# Patient Record
Sex: FEMALE | Race: WHITE | Employment: FULL TIME | URBAN - METROPOLITAN AREA
[De-identification: names, ages, dates, MRNs, and addresses within clinical notes are randomized per-mention and may not be internally consistent; named-entity substitution may affect disease eponyms.]

---

## 2017-05-25 PROBLEM — Z86.010 PERSONAL HISTORY OF COLONIC POLYPS: Status: ACTIVE | Noted: 2017-05-25

## 2017-05-25 PROBLEM — Z86.0100 PERSONAL HISTORY OF COLONIC POLYPS: Status: ACTIVE | Noted: 2017-05-25

## 2018-12-10 PROBLEM — Z86.0100 HX OF COLONIC POLYPS: Status: ACTIVE | Noted: 2018-10-09

## 2018-12-10 PROBLEM — Z86.010 HX OF COLONIC POLYPS: Status: ACTIVE | Noted: 2018-10-09

## 2023-11-27 ENCOUNTER — TELEPHONE (OUTPATIENT)
Dept: FAMILY MEDICINE CLINIC | Facility: CLINIC | Age: 68
End: 2023-11-27

## 2023-11-27 ENCOUNTER — MED REC SCAN ONLY (OUTPATIENT)
Dept: FAMILY MEDICINE CLINIC | Facility: CLINIC | Age: 68
End: 2023-11-27

## 2023-11-27 NOTE — TELEPHONE ENCOUNTER
Received Medical Records from [address] [place] [address]/[facility] 11/27/23. Sent to scanning.

## 2023-12-13 RX ORDER — SEMAGLUTIDE 0.68 MG/ML
INJECTION, SOLUTION SUBCUTANEOUS
Qty: 1 EACH | Refills: 0 | OUTPATIENT
Start: 2023-12-13

## 2023-12-13 RX ORDER — SEMAGLUTIDE 0.68 MG/ML
INJECTION, SOLUTION SUBCUTANEOUS
COMMUNITY
Start: 2023-10-23

## 2023-12-13 NOTE — TELEPHONE ENCOUNTER
Pt requesting a refill on her Ozempic 0.5      [facility], [address] [place] [address]  [phone]

## 2023-12-13 NOTE — TELEPHONE ENCOUNTER
LOV 05/26/23 w/ Dr. Karl Cuello   (was seen for routine pap, screenings, etc -but not listed as well adult)    Last labs 07/30/21  Last refill on 10/23/23, for #3ml, with ? refills  OZEMPIC 0.25-0.5 MG/DOSE PEN     No future appointments. Order(s) pending, please review. Thank you.   [facility] [place]

## 2023-12-14 RX ORDER — SEMAGLUTIDE 0.68 MG/ML
INJECTION, SOLUTION SUBCUTANEOUS
Qty: 1 EACH | Refills: 0 | Status: CANCELLED | OUTPATIENT
Start: 2023-12-14

## 2023-12-14 NOTE — TELEPHONE ENCOUNTER
Left detailed message to voicemail (per verbal release form consent with confirmed identifying message) of Dr. Ivan Morataya note below.  Patient was advised to call office back - needs to schedule appt w/ Dr. Gemma Patel

## 2023-12-14 NOTE — TELEPHONE ENCOUNTER
I have not prescribed Ozempic for this patient before and if she wishes to have that she is advised that or even a virtual visit to establish care here.  Thank jigna mackey

## 2023-12-19 NOTE — TELEPHONE ENCOUNTER
Left detailed message to voicemail (per verbal release form consent with confirmed identifying message) of Dr. Elizabeth Velasquez note below. Patient was advised to call office back - needs to schedule appt w/ Dr. Graham Cuevas       No future appointments.

## 2024-08-07 ENCOUNTER — TELEPHONE (OUTPATIENT)
Dept: FAMILY MEDICINE CLINIC | Facility: CLINIC | Age: 69
End: 2024-08-07

## 2024-08-07 DIAGNOSIS — Z00.00 MEDICARE ANNUAL WELLNESS VISIT, SUBSEQUENT: Primary | ICD-10-CM

## 2024-08-07 DIAGNOSIS — R63.5 UNEXPLAINED WEIGHT GAIN: ICD-10-CM

## 2024-08-07 NOTE — TELEPHONE ENCOUNTER
Left message to voicemail.  Advised patient to call office back [phone] - need to discuss note below.

## 2024-08-07 NOTE — TELEPHONE ENCOUNTER
PT CALLED AND ADV WAS FORMER PT    PT WOULD LIKE TO KNOW IF DR CAN PLACE ORDERS BEFORE VISIT    PT HAS BEEN DIETING SINCE FEBRUARY AND HAS HAD NO MOVEMENT.    WONDERING IF THERE IS A HORMONAL TEST?    PLEASE ADV    Future Appointments   Date Time Provider Department Center   9/16/2024  8:20 AM Ayana Curiel MD [facility] [place]       THANK YOU

## 2024-10-23 ENCOUNTER — TELEPHONE (OUTPATIENT)
Dept: FAMILY MEDICINE CLINIC | Facility: CLINIC | Age: 69
End: 2024-10-23

## 2024-10-23 NOTE — TELEPHONE ENCOUNTER
Received fax and mail from [facility] regarding pt's labs completed 10/17/24  Original copy sent to scanning    Dr. Murrieta reviewed: Follow-up?    Future Appointments   Date Time Provider Department Center   11/4/2024 10:00 AM Ayana Curiel MD [facility] [place]     Copy placed in \"hold for future appts\" folder

## 2024-10-24 ENCOUNTER — MED REC SCAN ONLY (OUTPATIENT)
Dept: FAMILY MEDICINE CLINIC | Facility: CLINIC | Age: 69
End: 2024-10-24

## 2024-11-04 ENCOUNTER — OFFICE VISIT (OUTPATIENT)
Dept: FAMILY MEDICINE CLINIC | Facility: CLINIC | Age: 69
End: 2024-11-04
Payer: MEDICARE

## 2024-11-04 VITALS
WEIGHT: 183.38 LBS | OXYGEN SATURATION: 96 % | BODY MASS INDEX: 35.53 KG/M2 | DIASTOLIC BLOOD PRESSURE: 80 MMHG | HEIGHT: 60.24 IN | SYSTOLIC BLOOD PRESSURE: 128 MMHG | HEART RATE: 80 BPM | RESPIRATION RATE: 18 BRPM | TEMPERATURE: 97 F

## 2024-11-04 DIAGNOSIS — Z13.6 SCREENING, ISCHEMIC HEART DISEASE: ICD-10-CM

## 2024-11-04 DIAGNOSIS — E78.2 MIXED HYPERLIPIDEMIA: ICD-10-CM

## 2024-11-04 DIAGNOSIS — E66.01 MORBID (SEVERE) OBESITY DUE TO EXCESS CALORIES (HCC): ICD-10-CM

## 2024-11-04 DIAGNOSIS — Z12.31 ENCOUNTER FOR SCREENING MAMMOGRAM FOR HIGH-RISK PATIENT: ICD-10-CM

## 2024-11-04 DIAGNOSIS — Z78.0 POST-MENOPAUSAL: ICD-10-CM

## 2024-11-04 DIAGNOSIS — Z00.00 MEDICARE ANNUAL WELLNESS VISIT, SUBSEQUENT: Primary | ICD-10-CM

## 2024-11-04 DIAGNOSIS — R79.89 ABNORMAL CBC: ICD-10-CM

## 2024-11-04 DIAGNOSIS — R73.9 ELEVATED BLOOD SUGAR: ICD-10-CM

## 2024-11-04 DIAGNOSIS — Z12.11 SCREEN FOR COLON CANCER: ICD-10-CM

## 2024-11-04 LAB
BASOPHILS # BLD AUTO: 0.03 X10(3) UL (ref 0–0.2)
BASOPHILS NFR BLD AUTO: 0.4 %
DEPRECATED HBV CORE AB SER IA-ACNC: 29 NG/ML
EOSINOPHIL # BLD AUTO: 0.35 X10(3) UL (ref 0–0.7)
EOSINOPHIL NFR BLD AUTO: 4.3 %
ERYTHROCYTE [DISTWIDTH] IN BLOOD BY AUTOMATED COUNT: 13.3 %
EST. AVERAGE GLUCOSE BLD GHB EST-MCNC: 120 MG/DL (ref 68–126)
HBA1C MFR BLD: 5.8 % (ref ?–5.7)
HCT VFR BLD AUTO: 40.8 %
HGB BLD-MCNC: 12.7 G/DL
IMM GRANULOCYTES # BLD AUTO: 0.01 X10(3) UL (ref 0–1)
IMM GRANULOCYTES NFR BLD: 0.1 %
IRON SATN MFR SERPL: 15 %
IRON SERPL-MCNC: 55 UG/DL
LYMPHOCYTES # BLD AUTO: 2.32 X10(3) UL (ref 1–4)
LYMPHOCYTES NFR BLD AUTO: 28.5 %
MCH RBC QN AUTO: 28.5 PG (ref 26–34)
MCHC RBC AUTO-ENTMCNC: 31.1 G/DL (ref 31–37)
MCV RBC AUTO: 91.5 FL
MONOCYTES # BLD AUTO: 0.6 X10(3) UL (ref 0.1–1)
MONOCYTES NFR BLD AUTO: 7.4 %
NEUTROPHILS # BLD AUTO: 4.84 X10 (3) UL (ref 1.5–7.7)
NEUTROPHILS # BLD AUTO: 4.84 X10(3) UL (ref 1.5–7.7)
NEUTROPHILS NFR BLD AUTO: 59.3 %
PLATELET # BLD AUTO: 234 10(3)UL (ref 150–450)
RBC # BLD AUTO: 4.46 X10(6)UL
TOTAL IRON BINDING CAPACITY: 358 UG/DL (ref 250–425)
TRANSFERRIN SERPL-MCNC: 272 MG/DL (ref 250–380)
WBC # BLD AUTO: 8.2 X10(3) UL (ref 4–11)

## 2024-11-04 PROCEDURE — 83036 HEMOGLOBIN GLYCOSYLATED A1C: CPT | Performed by: FAMILY MEDICINE

## 2024-11-04 PROCEDURE — 82728 ASSAY OF FERRITIN: CPT | Performed by: FAMILY MEDICINE

## 2024-11-04 PROCEDURE — 83540 ASSAY OF IRON: CPT | Performed by: FAMILY MEDICINE

## 2024-11-04 PROCEDURE — 85025 COMPLETE CBC W/AUTO DIFF WBC: CPT | Performed by: FAMILY MEDICINE

## 2024-11-04 PROCEDURE — 83550 IRON BINDING TEST: CPT | Performed by: FAMILY MEDICINE

## 2024-11-04 RX ORDER — PRAVASTATIN SODIUM 10 MG
10 TABLET ORAL NIGHTLY
Qty: 30 TABLET | Refills: 2 | Status: SHIPPED | OUTPATIENT
Start: 2024-11-04

## 2024-11-04 RX ORDER — CALCIUM CARBONATE 500 MG/1
1 TABLET, CHEWABLE ORAL DAILY
COMMUNITY

## 2024-11-04 RX ORDER — MULTIVITAMIN WITH IRON
50 TABLET ORAL DAILY
COMMUNITY

## 2024-11-04 RX ORDER — SEMAGLUTIDE 0.68 MG/ML
0.25 INJECTION, SOLUTION SUBCUTANEOUS WEEKLY
Qty: 3 ML | Refills: 0 | Status: SHIPPED | OUTPATIENT
Start: 2024-11-04

## 2024-11-04 NOTE — PROGRESS NOTES
Subjective:   Tammy Gonzalez is a 69 year old female who presents for a Medicare Subsequent Annual Wellness visit (Pt already had Initial Annual Wellness) and scheduled follow up of multiple significant but stable problems.   Obesity and mixed hyperlipidemia. Pt agrees to heart screen mammogram and cone scan. She is snot sexually active and has not had abnormal PAP in prior 10 years. She work sin [facility] [employer] and wishes to complete orders there. She has low mchc and is due for colonospcy . She is agreeable to iron studies and want to see Dr Rodriguez who did her prior colon screen    Pt was on ozempic and had to stop but wishes to try it again. She has a history of elevated blood sugar and agrees to have A1c checked    History/Other:   Fall Risk Assessment:   She has been screened for Falls and is low risk.      Cognitive Assessment:   She had a completely normal cognitive assessment - see flowsheet entries     Functional Ability/Status:   Tammy Gonzalez has some abnormal functions as listed below:  She has Vision problems based on screening of functional status.       Depression Screening (PHQ):  PHQ-2 SCORE: 0  , done 11/4/2024             Advanced Directives:   She does NOT have a Living Will. [Do you have a living will?: No]  She does NOT have a Power of  for Health Care. [Do you have a healthcare power of ?: No]  Discussed Advance Care Planning with patient (and family/surrogate if present). Standard forms made available to patient in After Visit Summary.      Patient Active Problem List   Diagnosis    Diverticulosis of colon    Hx of colonic polyps    Morbid (severe) obesity due to excess calories (HCC)     Allergies:  She has No Known Allergies.    Current Medications:  Outpatient Medications Marked as Taking for the 11/4/24 encounter (Office Visit) with Ayana Curiel MD   Medication Sig    vitamin B-12 50 MCG Oral Tab Take 1 tablet (50 mcg total) by mouth daily.     calcium carbonate 500 MG Oral Chew Tab Chew 1 tablet (500 mg total) by mouth daily.    semaglutide (OZEMPIC, 0.25 OR 0.5 MG/DOSE,) 2 MG/3ML Subcutaneous Solution Pen-injector Inject 0.25 mg into the skin once a week.    pravastatin 10 MG Oral Tab Take 1 tablet (10 mg total) by mouth nightly.    Cholecalciferol (VITAMIN D) 1000 units Oral Tab Take 1,000 Units by mouth.       Medical History:  She  has a past medical history of Diverticulosis of colon and colonic polyps (10/09/2018).  Surgical History:  She  has a past surgical history that includes  ();  (); colonoscopy (2011); colonoscopy (04/15/2015); and colonoscopy (10/09/2018).   Family History:  Her family history includes Cancer in her father and sister.  Social History:  She  reports that she quit smoking about 24 years ago. Her smoking use included cigarettes. She has been exposed to tobacco smoke. She has never used smokeless tobacco. She reports that she does not currently use alcohol. She reports that she does not use drugs.    Tobacco:  She smoked tobacco in the past but quit greater than 12 months ago.  Social History     Tobacco Use   Smoking Status Former    Current packs/day: 0.00    Types: Cigarettes    Quit date: 2000    Years since quittin.8    Passive exposure: Past   Smokeless Tobacco Never        CAGE Alcohol Screen:   CAGE screening score of 0 on 2024, showing low risk of alcohol abuse.      Patient Care Team:  Ayana Curiel MD as PCP - General (Family Medicine)    Review of Systems   As per HPI and all other systems reviewed and are negative      Objective:   Physical Exam    /80   Pulse 80   Temp 97.2 °F (36.2 °C) (Temporal)   Resp 18   Ht 5' 0.24\" (1.53 m)   Wt 183 lb 6.4 oz (83.2 kg)   SpO2 96%   BMI 35.54 kg/m²  Estimated body mass index is 35.54 kg/m² as calculated from the following:    Height as of this encounter: 5' 0.24\" (1.53 m).    Weight as of this encounter:  183 lb 6.4 oz (83.2 kg).  Physical Exam  Constitutional:       Appearance: Normal appearance.   HEENT:      Head: Normocephalic and atraumatic.      Eyes: PERRLA no notable nystagmus     Ears: normal on observation     Nose: Nose normal.      Mouth: Mucous membranes are moist.      Neck: no masses no bruit  Cardiovascular:      Rate and Rhythm: Normal rate and regular rhythm.   Pulmonary:      Effort: Pulmonary effort is normal.      Breath sounds: Normal breath sounds.   Abdominal:      General: Bowel sounds are normal.      Palpations: Abdomen is soft. There is no mass.   Musculoskeletal:         General: Normal range of motion.      Cervical back: Normal range of motion.   Skin:     General: Skin is warm and dry.   Neurological:      General: No focal deficit present.      Mental Status: She is alert and oriented to person, place, and time.   Psychiatric:         Mood and Affect: Mood normal.         Thought Content: Thought content normal.     Medicare Hearing Assessment:   Hearing Screening    Time taken: 11/4/2024 10:03 AM  Screening Method: Finger Rub  Finger Rub Result: Pass         Visual Acuity:   Right Eye Visual Acuity: Uncorrected Right Eye Chart Acuity: 20/70   Left Eye Visual Acuity: Corrected Left Eye Chart Acuity: 20/40   Both Eyes Visual Acuity: Corrected Both Eyes Chart Acuity: 20/40            Assessment & Plan:   Tammy Gonzalez is a 69 year old female who presents for a Medicare Assessment.     1. Medicare annual wellness visit, subsequent (Primary)  2. Encounter for screening mammogram for high-risk patient  -     [facility] DIAMOND 2D+3D SCREENING BILAT (CPT=77067/97822); Future; Expected date: 11/04/2024  3. Post-menopausal  -     XR DEXA BONE DENSITOMETRY (CPT=77080); Future; Expected date: 11/04/2024  4. Screening, ischemic heart disease  -     CT CALCIUM SCORING; Future; Expected date: 11/04/2024  5. Morbid (severe) obesity due to excess calories (HCC)  -     Hemoglobin A1C  -     Ozempic  (0.25 or 0.5 MG/DOSE); Inject 0.25 mg into the skin once a week.  Dispense: 3 mL; Refill: 0  6. Screen for colon cancer  -     Refer to General Surgery  7. Abnormal CBC  -     Refer to General Surgery  -     Cancel: CBC With Differential With Platelet  -     Cancel: Iron And Tibc  -     Cancel: Ferritin  -     CBC With Differential With Platelet  -     Iron And Tibc  -     Ferritin  8. Elevated blood sugar  -     Cancel: Hemoglobin A1C  -     Hemoglobin A1C  -     Ozempic (0.25 or 0.5 MG/DOSE); Inject 0.25 mg into the skin once a week.  Dispense: 3 mL; Refill: 0  9. Mixed hyperlipidemia  -     Pravastatin Sodium; Take 1 tablet (10 mg total) by mouth nightly.  Dispense: 30 tablet; Refill: 2    The patient indicates understanding of these issues and agrees to the plan.  Imaging studies ordered.  Lab work ordered.  Reinforced healthy diet, lifestyle, and exercise.      No follow-ups on file.     Ayana Curiel MD, 11/4/2024     Supplementary Documentation:   General Health:  In the past six months, have you lost more than 10 pounds without trying?: 2 - No  Has your appetite been poor?: No  Type of Diet: Balanced  How does the patient maintain a good energy level?: Appropriate Exercise  How would you describe your daily physical activity?: Moderate  How would you describe your current health state?: Good  How do you maintain positive mental well-being?: Visiting Friends;Visiting Family;Social Interaction  On a scale of 0 to 10, with 0 being no pain and 10 being severe pain, what is your pain level?: 0 - (None)  In the past six months, have you experienced urine leakage?: 0-No  At any time do you feel concerned for the safety/well-being of yourself and/or your children, in your home or elsewhere?: No  Have you had any immunizations at another office such as Influenza, Hepatitis B, Tetanus, or Pneumococcal?: Yes    Health Maintenance   Topic Date Due    Annual Physical  Never done    Mammogram  Never done    Zoster  Vaccines (1 of 2) Never done    Colorectal Cancer Screening  10/09/2021    DEXA Scan  Never done    Pneumococcal Vaccine: 65+ Years (1 of 1 - PCV) Never done    Influenza Vaccine  Completed    Annual Depression Screening  Completed    Fall Risk Screening (Annual)  Completed    COVID-19 Vaccine  Completed        No

## 2024-11-07 ENCOUNTER — TELEPHONE (OUTPATIENT)
Dept: FAMILY MEDICINE CLINIC | Facility: CLINIC | Age: 69
End: 2024-11-07

## 2024-11-07 NOTE — TELEPHONE ENCOUNTER
Patient requesting prescriptions written to be cancelled, states she will call back once her Medicare prescription assistance begins in December. Patient states that she would like the prescriptions re-written then so that they are covered in December. Endorsed to RN.

## 2024-11-07 NOTE — TELEPHONE ENCOUNTER
Called and spoke with patient - patient confirms would like to cancel Rx sent 11/04/24 - has new insurance that will take effect 12/01/24    Advised patient will cancel Rx and for patient to call office back a few days before when ready for refill - she verbalized understanding. No further questions at this time    Medication list updated    Routing as FYI

## 2024-11-18 ENCOUNTER — VIRTUAL PHONE E/M (OUTPATIENT)
Dept: FAMILY MEDICINE CLINIC | Facility: CLINIC | Age: 69
End: 2024-11-18
Payer: MEDICARE

## 2024-11-18 DIAGNOSIS — R73.02 IGT (IMPAIRED GLUCOSE TOLERANCE): ICD-10-CM

## 2024-11-18 DIAGNOSIS — R79.0 LOW SERUM FERRITIN LEVEL: Primary | ICD-10-CM

## 2024-11-18 NOTE — PROGRESS NOTES
Virtual Telephone Check-In    Tammy EmmanuelkielTeddy verbally consents to a Virtual/Telephone Check-In visit on 11/18/24.  Patient has been referred to the [facility] website at [email or web address] to review the yearly Consent to Treat document.    Patient understands and accepts financial responsibility for any deductible, co-insurance and/or co-pays associated with this service.    Duration of the service: 10 minutes      Summary of topics discussed: low serum ferritin and elevated A1c.  Patient was counseled on diet and exercise with respect to her A1c but advised to take low-dose iron and repeat the ferritin level in 1 month.  She did donate blood 3 months ago which may be the reason for her mild anemia however patient is yet to do colonoscopy and is agreeable to getting this done to ensure there is no GI cause for her iron deficiency.      Diagnoses and all orders for this visit:    Low serum ferritin level  -     Ferritin [E]; Future    IGT (impaired glucose tolerance)  -     Hemoglobin A1C [E]; Future          Ayana Curiel MD

## 2024-11-27 DIAGNOSIS — R73.9 ELEVATED BLOOD SUGAR: ICD-10-CM

## 2024-11-27 DIAGNOSIS — E66.01 MORBID (SEVERE) OBESITY DUE TO EXCESS CALORIES (HCC): ICD-10-CM

## 2024-11-27 DIAGNOSIS — E78.2 MIXED HYPERLIPIDEMIA: ICD-10-CM

## 2024-11-27 NOTE — TELEPHONE ENCOUNTER
Left message to voicemail (per verbal release form consent, confirmed with identifying message.)  Advised patient to call office back [phone] - need to clarify if patient needs next dose of Ozempic    Awaiting call back from patient    _________________________    LOV/MAWV 11/04/24  Last lipid panel 10/17/24; last A1C 11/04/24  Last refill on 11/04/24, for #30 tabs, with 2 refills  pravastatin 10 MG Oral Tab     Last refill on 11/04/24, for #3ml, with 0 refills  semaglutide (OZEMPIC, 0.25 OR 0.5 MG/DOSE,) 2 MG/3ML Subcutaneous Solution Pen-injector     No future appointments.    Order(s) pending, please review. Thank you.

## 2024-11-27 NOTE — TELEPHONE ENCOUNTER
PT CALLED AND ADV WOULD LIKE REFILLS SENT ON OVER TO PHARMACY    semaglutide (OZEMPIC, 0.25 OR 0.5 MG/DOSE,) 2 MG/3ML Subcutaneous Solution Pen-injector     AND    pravastatin 10 MG Oral Tab     PLEASE SEND TO [facility]    THANK YOU

## 2024-12-02 RX ORDER — PRAVASTATIN SODIUM 10 MG
10 TABLET ORAL NIGHTLY
Qty: 90 TABLET | Refills: 0 | Status: SHIPPED | OUTPATIENT
Start: 2024-12-02 | End: 2025-03-02

## 2024-12-02 RX ORDER — SEMAGLUTIDE 0.68 MG/ML
0.25 INJECTION, SOLUTION SUBCUTANEOUS WEEKLY
Qty: 3 ML | Refills: 0 | Status: SHIPPED | OUTPATIENT
Start: 2024-12-02

## 2024-12-02 NOTE — TELEPHONE ENCOUNTER
Please see notes below    Patient called back and advised PSR that she is \"Starting at lowest dose\" for Ozempic    Order(s) pending, please review. Thank you.  Dudley Reddy

## 2024-12-04 ENCOUNTER — TELEPHONE (OUTPATIENT)
Dept: FAMILY MEDICINE CLINIC | Facility: CLINIC | Age: 69
End: 2024-12-04

## 2024-12-04 DIAGNOSIS — E66.01 MORBID (SEVERE) OBESITY DUE TO EXCESS CALORIES (HCC): ICD-10-CM

## 2024-12-04 DIAGNOSIS — G47.33 OSA ON CPAP: Primary | ICD-10-CM

## 2024-12-04 DIAGNOSIS — R73.9 ELEVATED BLOOD SUGAR: ICD-10-CM

## 2024-12-04 NOTE — TELEPHONE ENCOUNTER
Received fax from [employer] regarding PA request for Rx  Ozempic (0.25 or 0.5 MG/DOSE) 2 MG/3ML Subcutaneous Solution Pen-injector (semaglutide)     Received fax from [employer] regarding \"Request for Medicare Prescription Drug Coverage Determination\"    ePA requested via Epic  Closed   12/4/2024  9:46 AM  Close reason: Other   Note from payer: [employer] is not able to process this request through ePA, please contact the plan at [phone] or fax in request to [phone].   Payer: [employer]    [phone]

## 2024-12-04 NOTE — TELEPHONE ENCOUNTER
[employer] \"Request for Medicare Prescription Drug Coverage Determination\" Form - signed by Dr. Murrieta  Faxed back to [employer] to fax#[phone]    Awaiting PA response

## 2024-12-04 NOTE — TELEPHONE ENCOUNTER
[facility] \"Request for Medicare Prescription Drug Coverage Determination\" Form     Dr. Murrieta to review/complete and sign    Need to fax back to #[phone]

## 2024-12-11 NOTE — TELEPHONE ENCOUNTER
Please see note below  (Have not received fax of denial yet)    Reviewed patient's chart - 08/18/23 refill - at [facility] - for Rx ozempic  No diagnosis found    Please advise, thank you

## 2024-12-11 NOTE — TELEPHONE ENCOUNTER
Patient states received notice that medication was denied, states that it is missing a DX code.     Patient states on 08/2023 was approved under appropriate DX and hoping to try sending under that previously used one. Endorsed to RN.

## 2024-12-12 NOTE — TELEPHONE ENCOUNTER
Per Dr. Murrieta: She needs to get the records to us for the diagnosis code     Reviewed patient's chart - Care Everywhere  Office visit 05/26/23 - Dx Morbid (severe) obesity due to excess calories (HCC)  Refill encounter 08/18/24 for Rx Ozempic    Same diagnosis code used for Rx Ozempic sent 12/02/24, including Dx Elevated blood sugar    Advised patient of note above. Patient verbalized understanding. Inquiring for patient to send notice of denial to our office for RN/PCP to review since we have not received notice of denial yet - she verbalized understanding and stated unable to do this right now as she is on plane. Advised patient to send to office at her earliest convenience - she verbalized understanding. No further questions at this time.    Awaiting denial notice

## 2024-12-30 NOTE — TELEPHONE ENCOUNTER
Called #[phone] per patient request - \"NGRAIN\" - answering service states PA for Rx Ozempic is \"currently in process and to call back at a later time\"      Advised patient of note above. Patient verbalized understanding. Advised patient to send denial letter via MCM or fax, our office fax# provided - patient verbalized understanding and stated will send copy of denial letter via fax. No further questions at this time.    Awaiting fax from patient at this time

## 2024-12-30 NOTE — TELEPHONE ENCOUNTER
Patient states we are to call the number below regarding the denail  [phone]    She also states that she was on medication from 8/2023 to 12/2023 and wanted to make sure the codes match     Patient would like the following  [facility]  [address] [place] [address]    Please adv  Thank you

## 2024-12-31 NOTE — TELEPHONE ENCOUNTER
Received fax from patient regarding \"[employer] - Notice of denial of Medicare Part D Prescription Drug Coverage\" Rx Ozempic (0.25 OR 0.5 MG/DOSE)    \"We must deny this request because the information provided by your prescriber (diagnosis of Drug-induced obesity) did not meet the requirements of a medically-accepted indication\"    Called and spoke with patient - inquired if patient had the rest of the pages of denial letter that has information regarding \"right to appeal\" - patient reports she no longer has the rest of the paperwork.   Advised patient will await for Dr. Murrieta to return on Monday to review - she verbalized understanding. No further questions.    Please advise, thank you

## 2025-01-08 NOTE — TELEPHONE ENCOUNTER
Per Dr. Murrieta: Zepbound is approved in patient with diabetes and or TERRI. Please check where she did sleep study and get report    Advised patient of Dr. Murrieta's note above. Patient verbalized understanding.     Patient reports she sees Dr. Tacho Medina in [place] - sleep study done  Patient will call their office as well for records request.    Called Dr. Tacho Medina in [facility]#[phone] - currently on lunch break, will need to try again

## 2025-01-08 NOTE — TELEPHONE ENCOUNTER
Patient called back - stated she called Dr. Medina's office and gave them verbal permission to release records to our office, stated need to fax records request, ATTN Angie    Advised patient will call for fax#- she verbalized understanding. No further questions at this time    Called Dr. Medina's office ph#[phone]  - was advised to send fax to fax#[phone]      Release of records form faxed to #[phone]  With comment: \"patient stated she called to give verbal permission to send records to our office. Please send patient's sleep study report with confirmed diagnosis of TERRI for PCP to review. Thank you\"    Awaiting fax back

## 2025-01-08 NOTE — TELEPHONE ENCOUNTER
Per Dr. Murrieta: If medication denied then only other non diabetic diagnosis approved is TERRI and zpebound only covered       Advised patient of Dr. Murrieta's note above. Patient verbalized understanding.     Patient confused - patient reports she does have TERRI and uses CPAP at night - can this diagnosis be added to Rx Ozempic?    Patient reports she had Rx Ozempic approved on August 2023 - patient took until December 2023  Now denied in 2024 - even though same insurance  Patient asking what diagnosis code was used when Rx was approved? - advised patient to contact insurance/drug program to inquire what diagnosis was used for approval -she verbalized understanding.    Advised patient will need to clarify with Dr. Murrieta regarding her message above - if she means that Rx zepbound only covered with diagnosis TERRI?  Or can use diagnosis TERRI for Rx Ozempic?    Please advise, thank you

## 2025-01-17 NOTE — TELEPHONE ENCOUNTER
Spoke with staff at [facility] and requested copy of sleep study faxed.    Office fax provided

## 2025-01-20 NOTE — TELEPHONE ENCOUNTER
Received fax from [facility] regarding patient's diagnostic sleep study report, completed 09/12/23    Dr. Murrieta to review    Please advise, thank you

## 2025-01-21 RX ORDER — SEMAGLUTIDE 0.68 MG/ML
0.25 INJECTION, SOLUTION SUBCUTANEOUS WEEKLY
Qty: 3 ML | Refills: 0 | Status: SHIPPED | OUTPATIENT
Start: 2025-01-21

## 2025-01-21 RX ORDER — TIRZEPATIDE 2.5 MG/.5ML
2.5 INJECTION, SOLUTION SUBCUTANEOUS WEEKLY
Qty: 2 ML | Refills: 0 | Status: CANCELLED | OUTPATIENT
Start: 2025-01-21 | End: 2025-02-12

## 2025-01-21 NOTE — TELEPHONE ENCOUNTER
ePA requested via Epic for Rx Ozempic    \"Closed   1/21/2025  9:42 AM  Close reason: Patient not eligible (does not have coverage with the payer)   Note from payer: [employer] is not able to process this request through ePA, please contact the plan at [phone] or fax in request to [phone].   Payer: [facility]    [phone]\"

## 2025-01-21 NOTE — TELEPHONE ENCOUNTER
Per Dr. Murrieta: Please get PA for Zepbound     Will need to send Rx in order to initiate PA for Rx Zepbound    Called and spoke with patient - confirmed preferred pharmacy is [facility] [address] [place] [address]  - patient confirmed ok to remove other pharmacies off preferred pharmacy list    Patient asking if Dx TERRI can be used for Rx Ozempic?  Patient was under impression this was for Rx Ozempic NOT for zepbound?    Please advise, thank you

## 2025-01-21 NOTE — TELEPHONE ENCOUNTER
[facility] Formulary Exception/Prior Authorization Request Form - completed and signed by Dr. Murrieta - faxed to [employer] fax#[phone] and #[phone]     Awaiting PA response    01/23/25: Received fax from [facility] regarding patient's PA request  \"Our records show that this is not a member we process prior authorizations for.\"

## 2025-01-23 ENCOUNTER — TELEPHONE (OUTPATIENT)
Dept: FAMILY MEDICINE CLINIC | Facility: CLINIC | Age: 70
End: 2025-01-23

## 2025-01-23 NOTE — TELEPHONE ENCOUNTER
Please refer to TE 12/04/24-    Received fax from [facility] regarding Notice of Denial of Medicare Part D Drug Coverage  For Rx Ozempic (0.25 or 0.5 MG/DOSE) 2 MG/3ML Subcutaneous Solution Pen-injector (semaglutide)      \"Your plan does not allow coverage of this medication based on your prescriber answering No to the following question(s):  Is the requested drug being prescribed to reduce the risk of major adverse cardiovascular (CV) events in an adult patient with type 2 diabetes mellitus and established CV diease?  Is the requested drug being prescribed to improve glycemic control in an adult patient with type 2 diabetes mellitus?\"    Per Dr. Murrieta: Please let Patient know that PA was denied

## 2025-01-23 NOTE — TELEPHONE ENCOUNTER
FYI:  Pt states that medication needs prior authorization.  Pt states insurance is sending it over.  Thank you!    semaglutide (OZEMPIC, 0.25 OR 0.5 MG/DOSE,) 2 MG/3ML Subcutaneous Solution Pen-injector [[id number]] (Order [id number])

## 2025-01-23 NOTE — TELEPHONE ENCOUNTER
Received fax from [facility] regarding Notice of Denial of Medicare Part D Drug Coverage  For Rx Ozempic (0.25 or 0.5 MG/DOSE) 2 MG/3ML Subcutaneous Solution Pen-injector (semaglutide)     \"Your plan does not allow coverage of this medication based on your prescriber answering No to the following question(s):  Is the requested drug being prescribed to reduce the risk of major adverse cardiovascular (CV) events in an adult patient with type 2 diabetes mellitus and established CV diease?  Is the requested drug being prescribed to improve glycemic control in an adult patient with type 2 diabetes mellitus?\"    Please advise, thank you

## 2025-01-24 NOTE — TELEPHONE ENCOUNTER
Spoke with the pt and advised that the [employer] insurance has denied her prior auth for Ozempic due to her not meeting requirements  The answer of No to the question of is the drug being prescribed to reduce the risk of major adverse cardiovascular events in an adult patient with type 2 diabetes and established CV disease.  Is the drug being prescribed to improve glycemic control in an adult patient with type 2 diabetes mellitus.    Pt states that she called her insurance and that she was told that she would be covered- advised that this medication is used to control blood sugar in diabetics.    Pt asks why the medication was covered in 2023. I tried to explain that as the glp-1s have become more popular the insurance companies have adjusted the requirements to qualify for these medications.  The pt would like a copy of the denial -this RN offered to fax the form pt states that she will have to call back.

## 2025-01-30 ENCOUNTER — TELEPHONE (OUTPATIENT)
Dept: FAMILY MEDICINE CLINIC | Facility: CLINIC | Age: 70
End: 2025-01-30

## 2025-01-30 NOTE — TELEPHONE ENCOUNTER
Spoke with patient, Patient is asking for a to swtich from Ozempic to Wegovy as insurance will not cover. Patient advised that if she is not Diabetic and or obese with chronic medical issues that the GLP1 are not being covered. Patient advised will check with provider if willing to switch and attempt.

## 2025-02-10 ENCOUNTER — TELEPHONE (OUTPATIENT)
Dept: FAMILY MEDICINE CLINIC | Facility: CLINIC | Age: 70
End: 2025-02-10

## 2025-02-10 NOTE — TELEPHONE ENCOUNTER
Received fax from [facility] regarding patient's CT Heart Smart (CT Heart Scan) completed on 02/03/25    Dr. Murrieta to review.  Please advise, thank you      (CT calcium score order placed 11/04/24 discontinued due to \"done at another facility\")

## 2025-02-10 NOTE — TELEPHONE ENCOUNTER
Per Dr. Murrieta:  \"Heart scan negative for coronary calcification. Please let Patient know. Paper copy in to call patient folder.\"  Per Dr. Murrieta on paper copy: \"Heart scan shows no calcified plaque\"  (Sent to scanning)    Left message to voicemail (per verbal release form consent, NO identifying message to confirm.)  Advised patient to call office back [phone] - need to discuss notes above.

## 2025-02-13 ENCOUNTER — TELEPHONE (OUTPATIENT)
Dept: FAMILY MEDICINE CLINIC | Facility: CLINIC | Age: 70
End: 2025-02-13

## 2025-02-13 NOTE — TELEPHONE ENCOUNTER
Advised patient of Dr. Murrieta's note below. Patient verbalized understanding.   Patient asking if also received XR bone scan and mammogram results?  Patient reports had all done on same day - advised patient that we have NOT received these results yet and to call [facility] to send records to our office, our office fax# provided - she verbalized understanding.    Awaiting fax for XR bone scan and mammogram

## 2025-02-13 NOTE — TELEPHONE ENCOUNTER
Patient reports Rx Ozempic not being covered by insurance.    Patient asking for alternatives?    Advised patient to contact insurance for list of formulary/preferred medications - patient reports whenever she calls her insurance, she is advised that Rx are covered, but then are not covered when Rx sent to pharmacy    Please advise, thank you

## 2025-02-14 ENCOUNTER — TELEPHONE (OUTPATIENT)
Dept: FAMILY MEDICINE CLINIC | Facility: CLINIC | Age: 70
End: 2025-02-14

## 2025-02-14 NOTE — TELEPHONE ENCOUNTER
Patient has medicare.  They do not cover weight loss medication. Plan exclusion  They will only cover ozempic, trulicity or mounjaro for type 2 diabetes.    Will not cover for prediabetes    Please advise if patient is diabetic..  If not, medications will not be covered.

## 2025-02-14 NOTE — TELEPHONE ENCOUNTER
Patient notified and verbalized understanding.     States she travels for work so would not be able to schedule with weight loss clinic.

## 2025-02-14 NOTE — TELEPHONE ENCOUNTER
Patient asking if office received work form. States it needs to be faxed to her employer    Patient advised form has been received and placed in Dr Lit baltazar.    Aware provider out of office until Monday

## 2025-02-17 NOTE — TELEPHONE ENCOUNTER
Dr. Murrieta reviewed and signed    Completed form faxed to fax#[phone] per patient request    Advised patient of note above. Patient verbalized understanding and requesting to mail copy to her home, confirmed address w/ patient - advised patient will mail copy, but may take 7-10 business days for patient to receive - she verbalized understanding. No further questions at this time.    Copy of completed form sent to patient via mail

## 2025-02-18 NOTE — TELEPHONE ENCOUNTER
Received fax from [employer] regarding patient's DEXA bone density results and mammogram results. Health maintenance has been updated. Awaiting signature from PCP to sent to scanning.

## 2025-05-19 ENCOUNTER — TELEPHONE (OUTPATIENT)
Dept: FAMILY MEDICINE CLINIC | Facility: CLINIC | Age: 70
End: 2025-05-19

## 2025-05-19 DIAGNOSIS — E66.09 OBESITY DUE TO EXCESS CALORIES, UNSPECIFIED CLASS, UNSPECIFIED WHETHER SERIOUS COMORBIDITY PRESENT: Primary | ICD-10-CM

## 2025-05-19 RX ORDER — TIRZEPATIDE 2.5 MG/.5ML
2.5 INJECTION, SOLUTION SUBCUTANEOUS WEEKLY
Qty: 2 ML | Refills: 0 | Status: SHIPPED | OUTPATIENT
Start: 2025-05-19 | End: 2025-05-19

## 2025-05-19 RX ORDER — TIRZEPATIDE 2.5 MG/.5ML
2.5 INJECTION, SOLUTION SUBCUTANEOUS WEEKLY
Qty: 2 ML | Refills: 0 | Status: SHIPPED | OUTPATIENT
Start: 2025-05-19 | End: 2025-06-10

## 2025-05-19 NOTE — TELEPHONE ENCOUNTER
Advised patient of Dr. Murrieta's note below. Patient verbalized understanding.   Advised patient to call office back for refill prior to running out of meds - she verbalized understanding.  Patient asking how often Dr. Murrieta wants her to do labs?  Advised patient that Dr. Murrieta did not mention, but to schedule in office appt with Dr. Murrieta when she is able - she verbalized understanding. No further questions at this time.    Does patient need to repeat labs while on Zepbound?  Please advise, thank you

## 2025-05-19 NOTE — TELEPHONE ENCOUNTER
Per Dr. Murrieta: \"Did Patient do her colonoscopy and needs repeat ferritin level. I need current weight before I can send it\"    Advised patient of Dr. Murrieta's note above. Patient verbalized understanding. Patient reports she has not completed colonoscopy yet, but plans to complete as new patient in [place].  Advised patient that she has active lab orders for ferritin and A1C - patient requesting to send lab orders to her email - advised patient unable to, but can send try to send as MCM - patient reports has difficulties accessing her Quick TVt  Patient reports will check for local lab locations and will call back with fax# to send lab orders to. No further questions at this time.    Patient reports current weight 192 lbs, height 5'2\"    Please send Rx to [facility] [id number], [place]; Ph#[phone]     Please advise, thank you

## 2025-05-19 NOTE — TELEPHONE ENCOUNTER
Per Dr. Murrieta: \"She must follow up in three months for labs and wellness. No further med refill beyond this time\"

## 2025-05-19 NOTE — TELEPHONE ENCOUNTER
Patient requesting to speak to nurse    She would like to talk about weight loss medication    Please adv  Thank you

## 2025-05-19 NOTE — TELEPHONE ENCOUNTER
Patient reports would like to start on weight loss medication  Patient accepting that her insurance will not cover weight loss medication    Patient has done research on Rx Zepbound  Patient asking if Dr. Murrieta willing to prescribe?    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  Patient reports records from Dr. Tacho Garber - pulmonology in [place] - sleep study report, done around July 2021 - sent to our office - advised patient will have to look into patient's chart for records  (Reviewed patient's chart - no records of prior sleep study found; TE 01/23/25 states that PA denied due to patient not being diabetic and does not have cardiac risk)    Advised patient that Dr. Murrieta prefers to discuss weight loss medications with a visit and unable to offer patient video/phone call visit as she is currently out of state - patient verbalized understanding and reports she will be in town around Labor Day weekend    Ok to send Rx Zepbound for patient?  Please advise, thank you

## 2025-06-14 DIAGNOSIS — E66.09 OBESITY DUE TO EXCESS CALORIES, UNSPECIFIED CLASS, UNSPECIFIED WHETHER SERIOUS COMORBIDITY PRESENT: ICD-10-CM

## 2025-06-16 RX ORDER — TIRZEPATIDE 2.5 MG/.5ML
2.5 INJECTION, SOLUTION SUBCUTANEOUS WEEKLY
Qty: 2 ML | Refills: 0 | Status: SHIPPED | OUTPATIENT
Start: 2025-06-16 | End: 2025-07-08

## 2025-06-16 NOTE — TELEPHONE ENCOUNTER
Last refill:   Medication Quantity Refills Start End   Tirzepatide-Weight Management (ZEPBOUND) 2.5 MG/0.5ML Subcutaneous Solution Auto-injector () 2 mL 0 2025 6/10/2025     Last Visit: 24    Next Visit: No future appointments.      Forward to Dr. Can (covering provider) please advise on refills. Thanks.

## 2025-07-29 ENCOUNTER — TELEPHONE (OUTPATIENT)
Dept: FAMILY MEDICINE CLINIC | Facility: CLINIC | Age: 70
End: 2025-07-29